# Patient Record
Sex: MALE | Race: WHITE | NOT HISPANIC OR LATINO
[De-identification: names, ages, dates, MRNs, and addresses within clinical notes are randomized per-mention and may not be internally consistent; named-entity substitution may affect disease eponyms.]

---

## 2017-10-09 PROBLEM — Z00.00 ENCOUNTER FOR PREVENTIVE HEALTH EXAMINATION: Status: ACTIVE | Noted: 2017-10-09

## 2017-10-16 ENCOUNTER — APPOINTMENT (OUTPATIENT)
Dept: DERMATOLOGY | Facility: CLINIC | Age: 47
End: 2017-10-16
Payer: COMMERCIAL

## 2017-10-16 VITALS — BODY MASS INDEX: 22.97 KG/M2 | WEIGHT: 179 LBS | HEIGHT: 74 IN

## 2017-10-16 PROCEDURE — 99203 OFFICE O/P NEW LOW 30 MIN: CPT

## 2020-06-23 ENCOUNTER — APPOINTMENT (OUTPATIENT)
Dept: DERMATOLOGY | Facility: CLINIC | Age: 50
End: 2020-06-23
Payer: COMMERCIAL

## 2020-06-23 VITALS — BODY MASS INDEX: 22.97 KG/M2 | HEIGHT: 74 IN | WEIGHT: 179 LBS

## 2020-06-23 DIAGNOSIS — D23.9 OTHER BENIGN NEOPLASM OF SKIN, UNSPECIFIED: ICD-10-CM

## 2020-06-23 DIAGNOSIS — L82.1 OTHER SEBORRHEIC KERATOSIS: ICD-10-CM

## 2020-06-23 DIAGNOSIS — D22.5 MELANOCYTIC NEVI OF TRUNK: ICD-10-CM

## 2020-06-23 PROCEDURE — 99213 OFFICE O/P EST LOW 20 MIN: CPT

## 2020-06-23 NOTE — HISTORY OF PRESENT ILLNESS
[de-identified] : Pt. presents for skin check;\par No itching, bleeding, growing, changing lesions noted;\par Severity:  mild  \par Modifying factors:  none\par Associated symptoms:  none\par Context:  no association with activity \par Lesion was on R eyebrow;  was present for 2 years, resolved spontaneously; \par

## 2020-06-23 NOTE — ASSESSMENT
[FreeTextEntry1] : Complete skin examination is negative for malignancy;\par Continue regular exams;\par R brow lesion;  ? inflamed SK;  resolved spontaneously;  f/u if recurs; \par Follow up for TBSE in 1 year

## 2020-06-23 NOTE — PHYSICAL EXAM
[Full Body Skin Exam Performed] : performed [FreeTextEntry3] : Skin examination performed of the face, neck, trunk, arms, legs; \par The patient is well, alert and oriented, pleasant and cooperative.\par Eyelids, conjunctivae, oral mucosa, digits and nails all normal.  \par No cervical adenopathy.\par \par Normal findings include:\par \par Seborrheic keratoses- R leg, back; \par Multiple benign nevi were noted. largest on R mid abdomen; \par Firm, pink, subcutaneous buttonholing papule R dorsal foot, L thigh\par no visible lesion R brow\par Angiomas\par Lentigines\par \par No lesions were suspicious for malignancy. \par \par

## 2021-04-03 ENCOUNTER — EMERGENCY (EMERGENCY)
Facility: HOSPITAL | Age: 51
LOS: 0 days | Discharge: HOME | End: 2021-04-03
Attending: EMERGENCY MEDICINE
Payer: COMMERCIAL

## 2021-04-03 VITALS
HEART RATE: 70 BPM | TEMPERATURE: 98 F | OXYGEN SATURATION: 99 % | DIASTOLIC BLOOD PRESSURE: 84 MMHG | RESPIRATION RATE: 16 BRPM | SYSTOLIC BLOOD PRESSURE: 141 MMHG

## 2021-04-03 VITALS — WEIGHT: 190.04 LBS

## 2021-04-03 DIAGNOSIS — S16.1XXA STRAIN OF MUSCLE, FASCIA AND TENDON AT NECK LEVEL, INITIAL ENCOUNTER: ICD-10-CM

## 2021-04-03 DIAGNOSIS — M54.2 CERVICALGIA: ICD-10-CM
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
## 2021-04-03 PROCEDURE — 72125 CT NECK SPINE W/O DYE: CPT | Mod: 26

## 2021-04-03 PROCEDURE — 99285 EMERGENCY DEPT VISIT HI MDM: CPT

## 2021-04-03 PROCEDURE — 99284 EMERGENCY DEPT VISIT MOD MDM: CPT | Mod: 25

## 2021-04-03 PROCEDURE — 70450 CT HEAD/BRAIN W/O DYE: CPT | Mod: 26

## 2021-04-03 PROCEDURE — 72125 CT NECK SPINE W/O DYE: CPT

## 2021-04-03 PROCEDURE — 70450 CT HEAD/BRAIN W/O DYE: CPT

## 2021-04-03 RX ORDER — IBUPROFEN 200 MG
1 TABLET ORAL
Qty: 12 | Refills: 0
Start: 2021-04-03 | End: 2021-04-05

## 2021-04-03 RX ORDER — CYCLOBENZAPRINE HYDROCHLORIDE 10 MG/1
1 TABLET, FILM COATED ORAL
Qty: 9 | Refills: 0
Start: 2021-04-03 | End: 2021-04-05

## 2021-04-03 RX ORDER — IBUPROFEN 200 MG
600 TABLET ORAL ONCE
Refills: 0 | Status: COMPLETED | OUTPATIENT
Start: 2021-04-03 | End: 2021-04-03

## 2021-04-03 RX ADMIN — Medication 600 MILLIGRAM(S): at 17:46

## 2021-04-03 NOTE — ED STATDOCS - CARE PROVIDER_API CALL
Adria Avila)  Orthopaedic Surgery  [address] [place] [address]  Phone: [phone]  Fax: [phone]  Follow Up Time:

## 2021-04-03 NOTE — ED STATDOCS - PATIENT PORTAL LINK FT
You can access the FollowMyHealth Patient Portal offered by [facility] by registering at the following website: [email or web address]. By joining [employer]’s FollowMyHealth portal, you will also be able to view your health information using other applications (apps) compatible with our system.
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
## 2021-04-03 NOTE — ED STATDOCS - NS_ ATTENDINGSCRIBEDETAILS _ED_A_ED_FT
I, Jaime Becker MD,  performed the initial face to face bedside interview with this patient regarding history of present illness, review of symptoms and relevant past medical, social and family history.  I completed an independent physical examination.    The history, relevant review of systems, past medical and surgical history, medical decision making, and physical examination was documented by the scribe in my presence and I attest to the accuracy of the documentation.

## 2021-04-03 NOTE — ED STATDOCS - CLINICAL SUMMARY MEDICAL DECISION MAKING FREE TEXT BOX
plan: CT, antiinflammatory. plan: CT, antiinflammatory.      CT head and neck negative for acute findings.  Will DC with Flexeril and Motrin, Spine follow up.  Rosy Rangel PA-C

## 2021-04-03 NOTE — ED STATDOCS - MUSCULOSKELETAL, MLM
range of motion is not limited and there is no muscle tenderness. no midline spinal tenderness. range of motion is not limited and there is no muscle tenderness. +paraspinal tenderness to palpation

## 2022-08-09 PROBLEM — Z78.9 OTHER SPECIFIED HEALTH STATUS: Chronic | Status: ACTIVE | Noted: 2021-04-03

## 2022-08-12 ENCOUNTER — NON-APPOINTMENT (OUTPATIENT)
Age: 52
End: 2022-08-12

## 2022-08-16 ENCOUNTER — APPOINTMENT (OUTPATIENT)
Dept: GASTROENTEROLOGY | Facility: CLINIC | Age: 52
End: 2022-08-16

## 2022-08-16 ENCOUNTER — NON-APPOINTMENT (OUTPATIENT)
Age: 52
End: 2022-08-16

## 2022-08-16 VITALS
SYSTOLIC BLOOD PRESSURE: 130 MMHG | HEIGHT: 74 IN | BODY MASS INDEX: 24 KG/M2 | DIASTOLIC BLOOD PRESSURE: 88 MMHG | HEART RATE: 59 BPM | WEIGHT: 187 LBS

## 2022-08-16 DIAGNOSIS — Z12.11 ENCOUNTER FOR SCREENING FOR MALIGNANT NEOPLASM OF COLON: ICD-10-CM

## 2022-08-16 PROCEDURE — 99202 OFFICE O/P NEW SF 15 MIN: CPT

## 2022-08-16 RX ORDER — SODIUM SULFATE, POTASSIUM SULFATE, MAGNESIUM SULFATE 17.5; 3.13; 1.6 G/ML; G/ML; G/ML
17.5-3.13-1.6 SOLUTION, CONCENTRATE ORAL
Qty: 1 | Refills: 0 | Status: ACTIVE | COMMUNITY
Start: 2022-08-16 | End: 1900-01-01

## 2022-08-18 NOTE — ASSESSMENT
[FreeTextEntry1] : Andrew Kee is a 52 year old male with no PMH presenting today for initial evaluation. Has never undergone colonoscopy, is looking to schedule screening. Denies FMH of colon cancer, denies GI issues. Reports daily bowel movements that are soft without straining or blood. Denies abdominal pain,  nausea, vomiting, dysphagia, or unintentional weight loss. \par \par Plan:\par Schedule screening colonoscopy, discussed risks versus benefits as well as instructions. pt agrees to plan, discussed with Dr. Beltrán.

## 2022-08-18 NOTE — HISTORY OF PRESENT ILLNESS
[de-identified] : Andrew Kee is a 52 year old male with no PMH presenting today for initial evaluation. Has never undergone colonoscopy, is looking to schedule screening. Denies FMH of colon cancer, denies GI issues. Reports daily bowel movements that are soft without straining or blood. Denies abdominal pain,  nausea, vomiting, dysphagia, or unintentional weight loss.

## 2022-09-14 ENCOUNTER — NON-APPOINTMENT (OUTPATIENT)
Age: 52
End: 2022-09-14

## 2022-09-19 ENCOUNTER — RESULT REVIEW (OUTPATIENT)
Age: 52
End: 2022-09-19

## 2022-09-19 ENCOUNTER — APPOINTMENT (OUTPATIENT)
Dept: GASTROENTEROLOGY | Facility: AMBULATORY MEDICAL SERVICES | Age: 52
End: 2022-09-19

## 2022-09-19 PROCEDURE — 45380 COLONOSCOPY AND BIOPSY: CPT | Mod: GC,33

## 2023-08-13 ENCOUNTER — NON-APPOINTMENT (OUTPATIENT)
Age: 53
End: 2023-08-13